# Patient Record
Sex: FEMALE | Race: WHITE | NOT HISPANIC OR LATINO | URBAN - METROPOLITAN AREA
[De-identification: names, ages, dates, MRNs, and addresses within clinical notes are randomized per-mention and may not be internally consistent; named-entity substitution may affect disease eponyms.]

---

## 2024-06-12 ENCOUNTER — OFFICE (OUTPATIENT)
Dept: URBAN - METROPOLITAN AREA CLINIC 11 | Facility: CLINIC | Age: 64
End: 2024-06-12

## 2024-06-12 VITALS
SYSTOLIC BLOOD PRESSURE: 126 MMHG | HEART RATE: 80 BPM | HEIGHT: 67 IN | OXYGEN SATURATION: 99 % | DIASTOLIC BLOOD PRESSURE: 69 MMHG | WEIGHT: 230 LBS

## 2024-06-12 DIAGNOSIS — K21.9 GASTRO-ESOPHAGEAL REFLUX DISEASE WITHOUT ESOPHAGITIS: ICD-10-CM

## 2024-06-12 DIAGNOSIS — Z79.01 LONG TERM (CURRENT) USE OF ANTICOAGULANTS: ICD-10-CM

## 2024-06-12 DIAGNOSIS — K59.00 CONSTIPATION, UNSPECIFIED: ICD-10-CM

## 2024-06-12 DIAGNOSIS — Z86.010 PERSONAL HISTORY OF COLONIC POLYPS: ICD-10-CM

## 2024-06-12 PROCEDURE — 99204 OFFICE O/P NEW MOD 45 MIN: CPT | Performed by: STUDENT IN AN ORGANIZED HEALTH CARE EDUCATION/TRAINING PROGRAM

## 2024-06-12 RX ORDER — FAMOTIDINE 20 MG/1
TABLET, FILM COATED ORAL
Qty: 28 | Refills: 0 | Status: COMPLETED
Start: 2024-06-12 | End: 2024-08-02 | Stop reason: NON-AVAIL

## 2024-06-12 RX ORDER — ONDANSETRON 4 MG/1
TABLET, ORALLY DISINTEGRATING ORAL
Qty: 20 | Refills: 3 | Status: ACTIVE
Start: 2024-06-12

## 2024-06-12 NOTE — SERVICEHPINOTES
Ms. Zeina Cardona is a 63-year-old white female with a past medical history of diabetes, obesity, GERD, hypertension, irritable bowel syndrome, atrial fibrillation on anticoagulation, who presents to gastro [facility] as a referral for chest pain, reflux symptoms despite PPI, nausea vomiting.  
br
br Clinic visit 06/12/2024: 
br Patient is seen by cardiologist Dr. Montes De Oca.  she had a recent nuclear stress test that was negative according to her.  She has a recent Holter monitor prescribed by Dr. Srivastava that results are still pending but she has completed the exam and is awaiting results.  She had colonoscopy in [facility] in 2021 which showed a 4 mm tubular adenoma, small hemorrhoids, few left-sided diverticulosis, repeat was recommended in 5 years.  She has never had an EGD.  She has had worsening nausea vomiting and heartburn as well as a dull ache in her chest that is episodic since January 2024.  She has had a workup by her cardiologist and she reports it has been otherwise negative.  She was recently started on Trulicity about 1 month ago and has had 4 shots of it.  She feels like her symptoms have worsened while on this medication.  She has some chronic constipation for which she has 1 bowel movement every 2-3 days.  She is tried MiraLax before and did not like it.  She takes Eliquis for atrial fibrillation and has no bleeding symptoms.  She has omeprazole 40 mg once a day that she has been taking for 5 years.  She does have the sensation of food getting stuck in her throat sometimes.  Sometimes her chest ache is worse with exertion.  Her nausea vomiting is worse at night for the last several months.  Her last hemoglobin A1c was in the 6 range but she has peripheral neuropathy due to diabetes.

## 2024-06-12 NOTE — SERVICENOTES
We will schedule the patient for EGD since she has upper abdominal symptoms and nausea vomiting that persist despite PPI use.  Discussed with her the risks and benefits of the procedure and she agrees to proceed.  Next colonoscopy will need to be in 2021 due to history of tubular adenoma.  will switch her to famotidine 2 weeks prior to procedure.  Zofran as needed.  We will get gastric emptying study as well to rule that out since she has longstanding diabetic.  It could be there symptoms are worsened due to Trulicity but need to rule out other organic pathology.  Follow up in few months after procedure.  All questions addressed.

## 2024-08-02 ENCOUNTER — AMBULATORY SURGICAL CENTER (OUTPATIENT)
Dept: URBAN - METROPOLITAN AREA SURGERY 3 | Facility: SURGERY | Age: 64
End: 2024-08-02
Payer: COMMERCIAL

## 2024-08-02 ENCOUNTER — OFFICE (OUTPATIENT)
Dept: URBAN - METROPOLITAN AREA PATHOLOGY 12 | Facility: PATHOLOGY | Age: 64
End: 2024-08-02
Payer: COMMERCIAL

## 2024-08-02 VITALS
OXYGEN SATURATION: 100 % | OXYGEN SATURATION: 100 % | DIASTOLIC BLOOD PRESSURE: 53 MMHG | HEART RATE: 62 BPM | DIASTOLIC BLOOD PRESSURE: 66 MMHG | DIASTOLIC BLOOD PRESSURE: 53 MMHG | OXYGEN SATURATION: 99 % | SYSTOLIC BLOOD PRESSURE: 104 MMHG | WEIGHT: 230.2 LBS | OXYGEN SATURATION: 96 % | SYSTOLIC BLOOD PRESSURE: 104 MMHG | SYSTOLIC BLOOD PRESSURE: 132 MMHG | HEART RATE: 67 BPM | DIASTOLIC BLOOD PRESSURE: 71 MMHG | HEIGHT: 67 IN | RESPIRATION RATE: 18 BRPM | HEART RATE: 52 BPM | SYSTOLIC BLOOD PRESSURE: 123 MMHG | RESPIRATION RATE: 17 BRPM | DIASTOLIC BLOOD PRESSURE: 59 MMHG | TEMPERATURE: 97.7 F | SYSTOLIC BLOOD PRESSURE: 97 MMHG | RESPIRATION RATE: 18 BRPM | OXYGEN SATURATION: 9 % | OXYGEN SATURATION: 9 % | DIASTOLIC BLOOD PRESSURE: 62 MMHG | HEART RATE: 63 BPM | SYSTOLIC BLOOD PRESSURE: 112 MMHG | WEIGHT: 230.2 LBS | HEART RATE: 66 BPM | OXYGEN SATURATION: 97 % | DIASTOLIC BLOOD PRESSURE: 62 MMHG | HEART RATE: 63 BPM | SYSTOLIC BLOOD PRESSURE: 112 MMHG | HEART RATE: 52 BPM | RESPIRATION RATE: 17 BRPM | SYSTOLIC BLOOD PRESSURE: 132 MMHG | OXYGEN SATURATION: 96 % | HEART RATE: 67 BPM | TEMPERATURE: 98 F | OXYGEN SATURATION: 97 % | RESPIRATION RATE: 20 BRPM | TEMPERATURE: 98 F | SYSTOLIC BLOOD PRESSURE: 97 MMHG | HEIGHT: 67 IN | TEMPERATURE: 97.7 F | DIASTOLIC BLOOD PRESSURE: 66 MMHG | DIASTOLIC BLOOD PRESSURE: 71 MMHG | HEART RATE: 66 BPM | RESPIRATION RATE: 20 BRPM | SYSTOLIC BLOOD PRESSURE: 123 MMHG | RESPIRATION RATE: 22 BRPM | HEART RATE: 62 BPM | DIASTOLIC BLOOD PRESSURE: 59 MMHG | RESPIRATION RATE: 22 BRPM | OXYGEN SATURATION: 99 %

## 2024-08-02 DIAGNOSIS — K22.2 ESOPHAGEAL OBSTRUCTION: ICD-10-CM

## 2024-08-02 DIAGNOSIS — K31.7 POLYP OF STOMACH AND DUODENUM: ICD-10-CM

## 2024-08-02 DIAGNOSIS — K21.9 GASTRO-ESOPHAGEAL REFLUX DISEASE WITHOUT ESOPHAGITIS: ICD-10-CM

## 2024-08-02 DIAGNOSIS — K29.50 UNSPECIFIED CHRONIC GASTRITIS WITHOUT BLEEDING: ICD-10-CM

## 2024-08-02 PROBLEM — K31.89 OTHER DISEASES OF STOMACH AND DUODENUM: Status: ACTIVE | Noted: 2024-08-02

## 2024-08-02 PROCEDURE — 88305 TISSUE EXAM BY PATHOLOGIST: CPT | Performed by: PATHOLOGY

## 2024-08-02 PROCEDURE — 43239 EGD BIOPSY SINGLE/MULTIPLE: CPT | Mod: 59 | Performed by: STUDENT IN AN ORGANIZED HEALTH CARE EDUCATION/TRAINING PROGRAM

## 2024-08-02 PROCEDURE — 88342 IMHCHEM/IMCYTCHM 1ST ANTB: CPT | Performed by: PATHOLOGY

## 2024-08-02 PROCEDURE — 43248 EGD GUIDE WIRE INSERTION: CPT | Performed by: STUDENT IN AN ORGANIZED HEALTH CARE EDUCATION/TRAINING PROGRAM

## 2024-08-02 PROCEDURE — 88313 SPECIAL STAINS GROUP 2: CPT | Performed by: PATHOLOGY

## 2024-08-02 RX ORDER — OMEPRAZOLE 40 MG/1
40 CAPSULE, DELAYED RELEASE ORAL
Qty: 60 | Refills: 6 | Status: ACTIVE

## 2024-08-02 RX ORDER — FAMOTIDINE 20 MG/1
TABLET, FILM COATED ORAL
Qty: 28 | Refills: 0 | Status: COMPLETED
Start: 2024-06-12 | End: 2024-08-02 | Stop reason: NON-AVAIL

## 2024-08-02 RX ADMIN — PROPOFOL 320 MG: 10 INJECTION, EMULSION INTRAVENOUS at 12:49

## 2024-08-02 NOTE — SERVICEHPINOTES
Ms. Zeina Cardona is a 64-year-old white female with a past medical history of diabetes, obesity, GERD, hypertension, irritable bowel syndrome, atrial fibrillation on anticoagulation, who initially presented to gastro 1 as a referral for chest pain, reflux symptoms despite PPI, nausea vomiting. Clinic visit 06/12/2024:brPatient is seen by cardiologist Dr. Montes De Oca.  she had a recent nuclear stress test that was negative according to her.  She has a recent Holter monitor prescribed by Dr. Srivastava that results are still pending but she has completed the exam and is awaiting results.  She had colonoscopy in [facility] in 2021 which showed a 4 mm tubular adenoma, small hemorrhoids, few left-sided diverticulosis, repeat was recommended in 5 years.  She has never had an EGD.  She has had worsening nausea vomiting and heartburn as well as a dull ache in her chest that is episodic since January 2024.  She has had a workup by her cardiologist and she reports it has been otherwise negative.  She was recently started on Trulicity about 1 month ago and has had 4 shots of it.  She feels like her symptoms have worsened while on this medication.  She has some chronic constipation for which she has 1 bowel movement every 2-3 days.  She is tried MiraLax before and did not like it.  She takes Eliquis for atrial fibrillation and has no bleeding symptoms.  She has omeprazole 40 mg once a day that she has been taking for 5 years.  She does have the sensation of food getting stuck in her throat sometimes.  Sometimes her chest ache is worse with exertion.  Her nausea vomiting is worse at night for the last several months.  Her last hemoglobin A1c was in the 6 range but she has peripheral neuropathy due to diabetes. 
br
br  EGD 8/2/24:  
brlast dose eliquis on Tuesday, vomiting is better since stopping Trulicity, switched to famotidine for 2 weeks prior to today, still having dysphagia to pills especially. She is agreeable to dilation if needed.

## 2024-08-02 NOTE — SERVICEHPINOTES
Ms. Zeina Cardona is a 64-year-old white female with a past medical history of diabetes, obesity, GERD, hypertension, irritable bowel syndrome, atrial fibrillation on anticoagulation, who initially presented to gastro 1 as a referral for chest pain, reflux symptoms despite PPI, nausea vomiting. Clinic visit 06/12/2024:brPatient is seen by cardiologist Dr. Montes De Oca.  she had a recent nuclear stress test that was negative according to her.  She has a recent Holter monitor prescribed by Dr. Srivastava that results are still pending but she has completed the exam and is awaiting results.  She had colonoscopy in [facility] in 2021 which showed a 4 mm tubular adenoma, small hemorrhoids, few left-sided diverticulosis, repeat was recommended in 5 years.  She has never had an EGD.  She has had worsening nausea vomiting and heartburn as well as a dull ache in her chest that is episodic since January 2024.  She has had a workup by her cardiologist and she reports it has been otherwise negative.  She was recently started on Trulicity about 1 month ago and has had 4 shots of it.  She feels like her symptoms have worsened while on this medication.  She has some chronic constipation for which she has 1 bowel movement every 2-3 days.  She is tried MiraLax before and did not like it.  She takes Eliquis for atrial fibrillation and has no bleeding symptoms.  She has omeprazole 40 mg once a day that she has been taking for 5 years.  She does have the sensation of food getting stuck in her throat sometimes.  Sometimes her chest ache is worse with exertion.  Her nausea vomiting is worse at night for the last several months.  Her last hemoglobin A1c was in the 6 range but she has peripheral neuropathy due to diabetes. 
br
br  EGD 8/2/24:  
brlast dose eliquis on Tuesday, vomiting is better since stopping Trulicity, switched to famotidine for 2 weeks prior to today, still having dysphagia to pills especially. She is agreeable to dilation if needed.

## 2024-08-09 LAB
GASTRO ONE PATHOLOGY: PDF REPORT: (no result)
GASTRO ONE PATHOLOGY: PDF REPORT: (no result)